# Patient Record
Sex: FEMALE | Race: BLACK OR AFRICAN AMERICAN | NOT HISPANIC OR LATINO
[De-identification: names, ages, dates, MRNs, and addresses within clinical notes are randomized per-mention and may not be internally consistent; named-entity substitution may affect disease eponyms.]

---

## 2017-04-28 ENCOUNTER — CHARTING TRANS (OUTPATIENT)
Dept: OTHER | Age: 37
End: 2017-04-28

## 2017-04-30 ENCOUNTER — CHARTING TRANS (OUTPATIENT)
Dept: OTHER | Age: 37
End: 2017-04-30

## 2018-03-02 ENCOUNTER — CHARTING TRANS (OUTPATIENT)
Dept: OTHER | Age: 38
End: 2018-03-02

## 2018-03-23 ENCOUNTER — CHARTING TRANS (OUTPATIENT)
Dept: OTHER | Age: 38
End: 2018-03-23

## 2018-03-25 ENCOUNTER — CHARTING TRANS (OUTPATIENT)
Dept: OTHER | Age: 38
End: 2018-03-25

## 2018-04-11 ENCOUNTER — CHARTING TRANS (OUTPATIENT)
Dept: OTHER | Age: 38
End: 2018-04-11

## 2018-04-11 ENCOUNTER — LAB SERVICES (OUTPATIENT)
Dept: OTHER | Age: 38
End: 2018-04-11

## 2018-04-11 ASSESSMENT — PAIN SCALES - GENERAL: PAINLEVEL_OUTOF10: 0

## 2018-04-12 ENCOUNTER — CHARTING TRANS (OUTPATIENT)
Dept: OTHER | Age: 38
End: 2018-04-12

## 2018-05-08 ENCOUNTER — IMAGING SERVICES (OUTPATIENT)
Dept: OTHER | Age: 38
End: 2018-05-08

## 2018-05-08 ENCOUNTER — HOSPITAL (OUTPATIENT)
Dept: OTHER | Age: 38
End: 2018-05-08
Attending: FAMILY MEDICINE

## 2018-05-16 ENCOUNTER — MYAURORA ACCOUNT LINK (OUTPATIENT)
Dept: OTHER | Age: 38
End: 2018-05-16

## 2018-05-16 ENCOUNTER — CHARTING TRANS (OUTPATIENT)
Dept: OTHER | Age: 38
End: 2018-05-16

## 2018-05-16 LAB
25(OH)D3+25(OH)D2 SERPL-MCNC: 14.4 NG/ML (ref 30–100)
ALBUMIN SERPL-MCNC: 3.9 G/DL (ref 3.6–5.1)
ALBUMIN/GLOB SERPL: 1.1 (ref 1–2.4)
ALP SERPL-CCNC: 91 UNITS/L (ref 45–117)
ALT SERPL-CCNC: 41 UNITS/L
ANION GAP SERPL CALC-SCNC: 17 MMOL/L (ref 10–20)
APPEARANCE UR: ABNORMAL
AST SERPL-CCNC: 24 UNITS/L
BACTERIA #/AREA URNS HPF: ABNORMAL /HPF
BASOPHILS # BLD: 0.1 K/MCL (ref 0–0.3)
BASOPHILS NFR BLD: 1 %
BILIRUB SERPL-MCNC: 0.3 MG/DL (ref 0.2–1)
BILIRUB UR QL: NEGATIVE
BUN SERPL-MCNC: 9 MG/DL (ref 6–20)
BUN/CREAT SERPL: 9 (ref 7–25)
CALCIUM SERPL-MCNC: 9.7 MG/DL (ref 8.4–10.2)
CHLORIDE SERPL-SCNC: 106 MMOL/L (ref 98–107)
CHOLEST SERPL-MCNC: 135 MG/DL
CHOLEST/HDLC SERPL: 3.1
CO2 SERPL-SCNC: 23 MMOL/L (ref 21–32)
COLOR UR: YELLOW
CREAT SERPL-MCNC: 0.96 MG/DL (ref 0.51–0.95)
DIFFERENTIAL METHOD BLD: ABNORMAL
EOSINOPHIL # BLD: 0.3 K/MCL (ref 0.1–0.5)
EOSINOPHIL NFR BLD: 3 %
ERYTHROCYTE [DISTWIDTH] IN BLOOD: 14.4 % (ref 11–15)
FERRITIN SERPL-MCNC: 128 NG/ML (ref 8–252)
FOLATE SERPL-MCNC: 9 NG/ML
GLOBULIN SER-MCNC: 3.5 G/DL (ref 2–4)
GLUCOSE SERPL-MCNC: 80 MG/DL (ref 65–99)
GLUCOSE UR-MCNC: NEGATIVE MG/DL
HBA1C MFR BLD: 6 % (ref 4.5–5.6)
HDLC SERPL-MCNC: 44 MG/DL
HEMATOCRIT: 40 % (ref 36–46.5)
HEMOGLOBIN: 13.1 G/DL (ref 12–15.5)
HYALINE CASTS #/AREA URNS LPF: ABNORMAL /LPF (ref 0–5)
IMM GRANULOCYTES # BLD AUTO: 0.1 K/MCL (ref 0–0.2)
IMM GRANULOCYTES NFR BLD: 1 %
IRON SATN MFR SERPL: 9 % (ref 15–45)
IRON SERPL-MCNC: 31 MCG/DL (ref 50–170)
KETONES UR-MCNC: NEGATIVE MG/DL
LDLC SERPL CALC-MCNC: 73 MG/DL
LENGTH OF FAST TIME PATIENT: ABNORMAL HRS
LENGTH OF FAST TIME PATIENT: ABNORMAL HRS
LYMPHOCYTES # BLD: 3.1 K/MCL (ref 1–4.8)
LYMPHOCYTES NFR BLD: 30 %
MEAN CORPUSCULAR HEMOGLOBIN: 27.8 PG (ref 26–34)
MEAN CORPUSCULAR HGB CONC: 32.8 G/DL (ref 32–36.5)
MEAN CORPUSCULAR VOLUME: 84.7 FL (ref 78–100)
MONOCYTES # BLD: 0.8 K/MCL (ref 0.3–0.9)
MONOCYTES NFR BLD: 8 %
MUCOUS THREADS URNS QL MICRO: PRESENT
NEUTROPHILS # BLD: 6 K/MCL (ref 1.8–7.7)
NEUTROPHILS NFR BLD: 57 %
NITRITE UR QL: NEGATIVE
NONHDLC SERPL-MCNC: 91 MG/DL
NRBC (NRBCRE): 0 %
PH UR: 5 UNITS (ref 5–7)
PLATELET COUNT: 442 K/MCL (ref 140–450)
POTASSIUM SERPL-SCNC: 4.1 MMOL/L (ref 3.4–5.1)
PROT UR QL: NEGATIVE MG/DL
RBC #/AREA URNS HPF: ABNORMAL /HPF (ref 0–3)
RBC-URINE: ABNORMAL
RED CELL COUNT: 4.72 MIL/MCL (ref 4–5.2)
SODIUM SERPL-SCNC: 142 MMOL/L (ref 135–145)
SP GR UR: 1.01 (ref 1–1.03)
SPECIMEN SOURCE: ABNORMAL
SQUAMOUS #/AREA URNS HPF: ABNORMAL /HPF (ref 0–5)
TIBC SERPL-MCNC: 331 MCG/DL (ref 250–450)
TOTAL PROTEIN: 7.4 G/DL (ref 6.4–8.2)
TRIGL SERPL-MCNC: 90 MG/DL
TSH SERPL-ACNC: 4.43 MCUNITS/ML (ref 0.35–5)
UROBILINOGEN UR QL: 0.2 MG/DL (ref 0–1)
VIT B12 SERPL-MCNC: 404 PG/ML (ref 211–911)
WBC #/AREA URNS HPF: ABNORMAL /HPF (ref 0–5)
WBC-URINE: NEGATIVE
WHITE BLOOD COUNT: 10.3 K/MCL (ref 4.2–11)

## 2018-05-16 ASSESSMENT — PAIN SCALES - GENERAL: PAINLEVEL_OUTOF10: 0

## 2018-07-03 ENCOUNTER — HOSPITAL (OUTPATIENT)
Dept: OTHER | Age: 38
End: 2018-07-03
Attending: FAMILY MEDICINE

## 2018-07-03 ENCOUNTER — IMAGING SERVICES (OUTPATIENT)
Dept: OTHER | Age: 38
End: 2018-07-03

## 2018-07-08 ENCOUNTER — CHARTING TRANS (OUTPATIENT)
Dept: OTHER | Age: 38
End: 2018-07-08

## 2018-07-20 ENCOUNTER — IMAGING SERVICES (OUTPATIENT)
Dept: OTHER | Age: 38
End: 2018-07-20

## 2018-07-20 ENCOUNTER — HOSPITAL (OUTPATIENT)
Dept: OTHER | Age: 38
End: 2018-07-20
Attending: FAMILY MEDICINE

## 2018-11-01 VITALS
DIASTOLIC BLOOD PRESSURE: 72 MMHG | OXYGEN SATURATION: 98 % | TEMPERATURE: 98.1 F | RESPIRATION RATE: 17 BRPM | HEIGHT: 67 IN | BODY MASS INDEX: 34.85 KG/M2 | WEIGHT: 222.04 LBS | SYSTOLIC BLOOD PRESSURE: 120 MMHG | HEART RATE: 85 BPM

## 2018-11-01 VITALS
WEIGHT: 225.01 LBS | RESPIRATION RATE: 16 BRPM | DIASTOLIC BLOOD PRESSURE: 72 MMHG | HEIGHT: 67 IN | BODY MASS INDEX: 35.31 KG/M2 | TEMPERATURE: 99.5 F | SYSTOLIC BLOOD PRESSURE: 116 MMHG | HEART RATE: 74 BPM | OXYGEN SATURATION: 98 %

## 2019-04-10 ENCOUNTER — OFFICE VISIT (OUTPATIENT)
Dept: OCCUPATIONAL MEDICINE | Age: 39
End: 2019-04-10
Attending: PHYSICIAN ASSISTANT

## 2019-04-10 DIAGNOSIS — Z01.84 IMMUNITY STATUS TESTING: Primary | ICD-10-CM

## 2019-04-10 PROCEDURE — 86480 TB TEST CELL IMMUN MEASURE: CPT

## 2020-01-15 PROBLEM — Z30.41 USES ORAL CONTRACEPTION: Status: ACTIVE | Noted: 2020-01-15

## 2020-01-15 PROCEDURE — 87624 HPV HI-RISK TYP POOLED RSLT: CPT | Performed by: NURSE PRACTITIONER

## 2020-01-15 PROCEDURE — 88175 CYTOPATH C/V AUTO FLUID REDO: CPT | Performed by: NURSE PRACTITIONER

## 2020-08-17 ENCOUNTER — LAB REQUISITION (OUTPATIENT)
Dept: LAB | Facility: HOSPITAL | Age: 40
End: 2020-08-17
Payer: COMMERCIAL

## 2020-08-17 DIAGNOSIS — Z20.828 CONTACT WITH AND (SUSPECTED) EXPOSURE TO OTHER VIRAL COMMUNICABLE DISEASES: ICD-10-CM

## 2020-08-17 LAB — SARS-COV-2 RNA RESP QL NAA+PROBE: NOT DETECTED

## 2020-08-18 ENCOUNTER — LAB REQUISITION (OUTPATIENT)
Dept: LAB | Facility: HOSPITAL | Age: 40
End: 2020-08-18
Payer: COMMERCIAL

## 2020-08-18 DIAGNOSIS — M67.439 GANGLION, UNSPECIFIED WRIST: ICD-10-CM

## 2020-08-18 PROCEDURE — 88304 TISSUE EXAM BY PATHOLOGIST: CPT | Performed by: PLASTIC SURGERY

## 2021-11-04 ENCOUNTER — OFFICE VISIT (OUTPATIENT)
Dept: ORTHOPEDICS CLINIC | Facility: CLINIC | Age: 41
End: 2021-11-04
Payer: COMMERCIAL

## 2021-11-04 ENCOUNTER — HOSPITAL ENCOUNTER (OUTPATIENT)
Dept: GENERAL RADIOLOGY | Age: 41
Discharge: HOME | End: 2021-11-04
Attending: PHYSICIAN ASSISTANT
Payer: COMMERCIAL

## 2021-11-04 VITALS — BODY MASS INDEX: 34.53 KG/M2 | HEART RATE: 76 BPM | HEIGHT: 67 IN | WEIGHT: 220 LBS | OXYGEN SATURATION: 98 %

## 2021-11-04 DIAGNOSIS — M25.511 RIGHT SHOULDER PAIN, UNSPECIFIED CHRONICITY: ICD-10-CM

## 2021-11-04 DIAGNOSIS — M75.41 IMPINGEMENT SYNDROME OF RIGHT SHOULDER: Primary | ICD-10-CM

## 2021-11-04 PROCEDURE — 99203 OFFICE O/P NEW LOW 30 MIN: CPT | Performed by: PHYSICIAN ASSISTANT

## 2021-11-04 PROCEDURE — 73030 X-RAY EXAM OF SHOULDER: CPT | Performed by: PHYSICIAN ASSISTANT

## 2021-11-04 PROCEDURE — 3008F BODY MASS INDEX DOCD: CPT | Performed by: PHYSICIAN ASSISTANT

## 2021-11-04 RX ORDER — POTASSIUM GLUCONATE 595(99)MG
1 TABLET ORAL DAILY
COMMUNITY

## 2021-11-04 NOTE — H&P
[facility] - ORTHOPEDICS  [facility] [address] [id number]  [phone]     NEW PATIENT VISIT - HISTORY AND PHYSICAL EXAMINATION     Name: Calli Vega   MRN: [id number]  Date: 11/4/2021     CC: Right shoul negative aside from the aforementioned per history of present illness.     SOCIAL HX:  Social History    Occupational History      Not on file    Tobacco Use      Smoking status: Never Smoker      Smokeless tobacco: Never Used    Vaping Use      Vaping Use: 4+-5-  Infraspinatus/Teres: 5/5    Provocative Tests:   Lawrence:   Positive  Speed's:   Positive  Dickinson's:   Negative  Lift-off:   Negative  Apprehension:  Negative  Sulcus Sign:   Negative    Neurovascular Upper Extremity (Bilateral)  Motor:    5/5 EP

## 2021-11-05 ENCOUNTER — HOSPITAL ENCOUNTER (OUTPATIENT)
Dept: MAMMOGRAPHY | Age: 41
Discharge: HOME | End: 2021-11-05
Attending: FAMILY MEDICINE
Payer: COMMERCIAL

## 2021-11-05 DIAGNOSIS — Z12.31 ENCOUNTER FOR SCREENING MAMMOGRAM FOR MALIGNANT NEOPLASM OF BREAST: ICD-10-CM

## 2021-11-05 PROCEDURE — 77063 BREAST TOMOSYNTHESIS BI: CPT | Performed by: FAMILY MEDICINE

## 2021-11-05 PROCEDURE — 77067 SCR MAMMO BI INCL CAD: CPT | Performed by: FAMILY MEDICINE

## 2021-11-20 ENCOUNTER — HOSPITAL ENCOUNTER (OUTPATIENT)
Dept: MRI IMAGING | Age: 41
Discharge: HOME | End: 2021-11-20
Attending: PHYSICIAN ASSISTANT
Payer: COMMERCIAL

## 2021-11-20 DIAGNOSIS — M75.41 IMPINGEMENT SYNDROME OF RIGHT SHOULDER: ICD-10-CM

## 2021-11-21 ENCOUNTER — HOSPITAL ENCOUNTER (OUTPATIENT)
Dept: MRI IMAGING | Facility: HOSPITAL | Age: 41
Discharge: HOME | End: 2021-11-21
Attending: PHYSICIAN ASSISTANT
Payer: COMMERCIAL

## 2021-11-21 PROCEDURE — 73221 MRI JOINT UPR EXTREM W/O DYE: CPT | Performed by: PHYSICIAN ASSISTANT

## 2021-11-24 ENCOUNTER — OFFICE VISIT (OUTPATIENT)
Dept: ORTHOPEDICS CLINIC | Facility: CLINIC | Age: 41
End: 2021-11-24
Payer: COMMERCIAL

## 2021-11-24 VITALS — HEART RATE: 76 BPM | BODY MASS INDEX: 34.53 KG/M2 | WEIGHT: 220 LBS | HEIGHT: 67 IN | OXYGEN SATURATION: 98 %

## 2021-11-24 DIAGNOSIS — M75.41 IMPINGEMENT SYNDROME OF RIGHT SHOULDER: ICD-10-CM

## 2021-11-24 DIAGNOSIS — M67.911 TENDINOPATHY OF RIGHT ROTATOR CUFF: Primary | ICD-10-CM

## 2021-11-24 PROCEDURE — 3008F BODY MASS INDEX DOCD: CPT | Performed by: PHYSICIAN ASSISTANT

## 2021-11-24 PROCEDURE — 99214 OFFICE O/P EST MOD 30 MIN: CPT | Performed by: PHYSICIAN ASSISTANT

## 2021-11-24 RX ORDER — MELOXICAM 15 MG/1
15 TABLET ORAL DAILY
Qty: 30 TABLET | Refills: 2 | Status: SHIPPED | OUTPATIENT
Start: 2021-11-24 | End: 2021-12-24

## 2021-11-24 NOTE — PROGRESS NOTES
[facility] - [facility]OPED[facility]  [address]       Name: Leopoldo Howell   MRN: [id number]  Date: 11/24/2021     REASON FOR VISIT: Follow up for MRI right shoulder.      INTERVAL HIST none  Atrophy:                             none    Scapular winging:            Negative     Palpation:                            AC Joint                              Negative  Biceps Tendon                  Positive  Greater Tuberosity year with no injury. Patient states pain will radiate down the lateral portion of her upper arm and she has pain with abduction. FINDINGS:  The glenohumeral joint space is maintained. There is no fracture, subluxation or dislocation.   There is no focal Mild AC joint hypertrophy without significant impression on the bursal surface of supraspinatus muscle or tendon. 4. Biceps tendon is intact.    Dictated by (CST): Tyshawn Chavarria MD on 11/22/2021 at 8:39 AM     Finalized by (CST): Tyshawn Chavarria MD on 11/22/2 IMPRESSION: Kip Wellington is a 37year old female who presented for follow up of right shoulder rotator cuff tendinopathy/impingement syndrome.        PLAN:   We had a detailed discussion outlining the etiology, anatomy, pathophysiology, and natura

## 2021-12-02 ENCOUNTER — HOSPITAL ENCOUNTER (OUTPATIENT)
Dept: MAMMOGRAPHY | Facility: HOSPITAL | Age: 41
Discharge: HOME | End: 2021-12-02
Attending: FAMILY MEDICINE
Payer: COMMERCIAL

## 2021-12-02 DIAGNOSIS — R92.2 INCONCLUSIVE MAMMOGRAM: ICD-10-CM

## 2021-12-02 PROCEDURE — 77066 DX MAMMO INCL CAD BI: CPT | Performed by: FAMILY MEDICINE

## 2021-12-02 PROCEDURE — 76642 ULTRASOUND BREAST LIMITED: CPT | Performed by: FAMILY MEDICINE

## 2021-12-02 PROCEDURE — 76641 ULTRASOUND BREAST COMPLETE: CPT | Performed by: FAMILY MEDICINE

## 2021-12-02 PROCEDURE — 77062 BREAST TOMOSYNTHESIS BI: CPT | Performed by: FAMILY MEDICINE

## 2021-12-02 NOTE — IMAGING NOTE
This [facility] RN assisted Dr. Casimiro Morrell with recommendation for a right breast 1 site ultrasound guided biopsy for mass. Procedure reviewed and all questions answered. Emotional and educational support given.    On the day of the biopsy, pt instructed to t

## 2021-12-20 ENCOUNTER — HOSPITAL ENCOUNTER (OUTPATIENT)
Dept: MAMMOGRAPHY | Facility: HOSPITAL | Age: 41
Discharge: HOME | End: 2021-12-20
Attending: FAMILY MEDICINE
Payer: COMMERCIAL

## 2021-12-20 DIAGNOSIS — R92.2 INCONCLUSIVE MAMMOGRAM: ICD-10-CM

## 2021-12-20 PROCEDURE — 88342 IMHCHEM/IMCYTCHM 1ST ANTB: CPT

## 2021-12-20 PROCEDURE — 77065 DX MAMMO INCL CAD UNI: CPT | Performed by: FAMILY MEDICINE

## 2021-12-20 PROCEDURE — 88341 IMHCHEM/IMCYTCHM EA ADD ANTB: CPT

## 2021-12-20 PROCEDURE — 88305 TISSUE EXAM BY PATHOLOGIST: CPT

## 2021-12-20 PROCEDURE — 19083 BX BREAST 1ST LESION US IMAG: CPT | Performed by: FAMILY MEDICINE

## 2021-12-21 ENCOUNTER — TELEPHONE (OUTPATIENT)
Dept: MAMMOGRAPHY | Facility: HOSPITAL | Age: 41
End: 2021-12-21

## 2021-12-21 NOTE — TELEPHONE ENCOUNTER
Telephoned Jose Najera and name,  verified with patient. Notified Jose Najera of right breast negative for malignancy biopsy result. Concordance pending. Jose Najera reports biopsy site is healing well.   Radiologist recommends next mammog

## 2022-01-19 ENCOUNTER — OFFICE VISIT (OUTPATIENT)
Dept: ORTHOPEDICS CLINIC | Facility: CLINIC | Age: 42
End: 2022-01-19
Payer: COMMERCIAL

## 2022-01-19 VITALS — HEIGHT: 67 IN | BODY MASS INDEX: 35.79 KG/M2 | WEIGHT: 228 LBS

## 2022-01-19 DIAGNOSIS — M75.41 IMPINGEMENT SYNDROME OF RIGHT SHOULDER: Primary | ICD-10-CM

## 2022-01-19 PROCEDURE — 3008F BODY MASS INDEX DOCD: CPT | Performed by: PHYSICIAN ASSISTANT

## 2022-01-19 PROCEDURE — 99213 OFFICE O/P EST LOW 20 MIN: CPT | Performed by: PHYSICIAN ASSISTANT

## 2022-01-19 NOTE — PROGRESS NOTES
[facility] - ORTHOPEDICS   [address]   [phone]       Name: Charmaine Lindsey   MRN: [id number]  Date: 1/19/2022     REASON FOR VISIT: Follow up for right shoulder rotator cuff tendinopathy/impingeme The contralateral shoulder is without limitation in range of motion or strength, no positive provocative maneuvers. Radiographic Examination/Diagnostics:    I personally viewed, independently interpreted and radiology report was reviewed.     [facility] Pathology Department. LESION(S) LOCALIZED:    12 o'clock position of the right breast 5 cm from the nipple BIOPSY NEEDLE:  12 gauge CLIP TYPE:                Rhianna # CORES OBTAINED:  6            CONCLUSION:  1.  Successful ultrasound biopsy of appropriate position. Post procedural mammogram performed on separate digital equipment show the clip in appropriate position. The procedure was tolerated well by the patient with no immediate complications.   All cores were submitted to the [facility]

## 2022-01-31 PROCEDURE — 88302 TISSUE EXAM BY PATHOLOGIST: CPT | Performed by: OBSTETRICS & GYNECOLOGY

## 2022-02-16 PROBLEM — D25.2 INTRAMURAL AND SUBSEROUS LEIOMYOMA OF UTERUS: Status: ACTIVE | Noted: 2022-02-16

## 2022-02-16 PROBLEM — D25.1 INTRAMURAL AND SUBSEROUS LEIOMYOMA OF UTERUS: Status: ACTIVE | Noted: 2022-02-16

## 2022-02-16 PROBLEM — Z98.51 S/P TUBAL LIGATION: Status: ACTIVE | Noted: 2022-02-16

## 2022-02-16 PROBLEM — Z30.41 USES ORAL CONTRACEPTION: Status: RESOLVED | Noted: 2020-01-15 | Resolved: 2022-02-16

## 2022-08-18 ENCOUNTER — HOSPITAL ENCOUNTER (OUTPATIENT)
Dept: MAMMOGRAPHY | Facility: HOSPITAL | Age: 42
Discharge: HOME | End: 2022-08-18
Attending: FAMILY MEDICINE
Payer: COMMERCIAL

## 2022-08-18 DIAGNOSIS — Z98.890 STATUS POST RIGHT BREAST BIOPSY: ICD-10-CM

## 2022-08-18 PROCEDURE — 76642 ULTRASOUND BREAST LIMITED: CPT | Performed by: FAMILY MEDICINE

## 2022-08-18 PROCEDURE — 77065 DX MAMMO INCL CAD UNI: CPT | Performed by: FAMILY MEDICINE

## 2022-08-18 PROCEDURE — 77061 BREAST TOMOSYNTHESIS UNI: CPT | Performed by: FAMILY MEDICINE

## 2022-10-24 ENCOUNTER — LAB ENCOUNTER (OUTPATIENT)
Dept: LAB | Age: 42
End: 2022-10-24
Attending: FAMILY MEDICINE
Payer: COMMERCIAL

## 2022-10-24 ENCOUNTER — OFFICE VISIT (OUTPATIENT)
Dept: FAMILY MEDICINE CLINIC | Facility: CLINIC | Age: 42
End: 2022-10-24
Payer: COMMERCIAL

## 2022-10-24 VITALS
SYSTOLIC BLOOD PRESSURE: 114 MMHG | HEIGHT: 68 IN | WEIGHT: 227 LBS | RESPIRATION RATE: 16 BRPM | HEART RATE: 71 BPM | OXYGEN SATURATION: 98 % | DIASTOLIC BLOOD PRESSURE: 84 MMHG | BODY MASS INDEX: 34.4 KG/M2

## 2022-10-24 DIAGNOSIS — Z00.00 LABORATORY EXAMINATION ORDERED AS PART OF A ROUTINE GENERAL MEDICAL EXAMINATION: ICD-10-CM

## 2022-10-24 DIAGNOSIS — L30.9 ECZEMA, UNSPECIFIED TYPE: ICD-10-CM

## 2022-10-24 DIAGNOSIS — Z00.00 WELLNESS EXAMINATION: Primary | ICD-10-CM

## 2022-10-24 DIAGNOSIS — Z12.31 ENCOUNTER FOR SCREENING MAMMOGRAM FOR BREAST CANCER: ICD-10-CM

## 2022-10-24 LAB
ALBUMIN SERPL-MCNC: 3.5 G/DL (ref 3.4–5)
ALBUMIN/GLOB SERPL: 0.9 {RATIO} (ref 1–2)
ALP LIVER SERPL-CCNC: 101 U/L
ALT SERPL-CCNC: 22 U/L
ANION GAP SERPL CALC-SCNC: 4 MMOL/L (ref 0–18)
AST SERPL-CCNC: 16 U/L (ref 15–37)
BASOPHILS # BLD AUTO: 0.05 X10(3) UL (ref 0–0.2)
BASOPHILS NFR BLD AUTO: 0.6 %
BILIRUB SERPL-MCNC: 0.4 MG/DL (ref 0.1–2)
BUN BLD-MCNC: 9 MG/DL (ref 7–18)
CALCIUM BLD-MCNC: 9.1 MG/DL (ref 8.5–10.1)
CHLORIDE SERPL-SCNC: 109 MMOL/L (ref 98–112)
CHOLEST SERPL-MCNC: 128 MG/DL (ref ?–200)
CO2 SERPL-SCNC: 26 MMOL/L (ref 21–32)
CREAT BLD-MCNC: 0.9 MG/DL
EOSINOPHIL # BLD AUTO: 0.4 X10(3) UL (ref 0–0.7)
EOSINOPHIL NFR BLD AUTO: 5.2 %
ERYTHROCYTE [DISTWIDTH] IN BLOOD BY AUTOMATED COUNT: 15.1 %
FASTING PATIENT LIPID ANSWER: YES
FASTING STATUS PATIENT QL REPORTED: YES
GFR SERPLBLD BASED ON 1.73 SQ M-ARVRAT: 82 ML/MIN/1.73M2 (ref 60–?)
GLOBULIN PLAS-MCNC: 4 G/DL (ref 2.8–4.4)
GLUCOSE BLD-MCNC: 98 MG/DL (ref 70–99)
HCT VFR BLD AUTO: 36.8 %
HDLC SERPL-MCNC: 44 MG/DL (ref 40–59)
HGB BLD-MCNC: 11.6 G/DL
IMM GRANULOCYTES # BLD AUTO: 0.03 X10(3) UL (ref 0–1)
IMM GRANULOCYTES NFR BLD: 0.4 %
LDLC SERPL CALC-MCNC: 74 MG/DL (ref ?–100)
LYMPHOCYTES # BLD AUTO: 2.2 X10(3) UL (ref 1–4)
LYMPHOCYTES NFR BLD AUTO: 28.4 %
MCH RBC QN AUTO: 26.5 PG (ref 26–34)
MCHC RBC AUTO-ENTMCNC: 31.5 G/DL (ref 31–37)
MCV RBC AUTO: 84 FL
MONOCYTES # BLD AUTO: 0.76 X10(3) UL (ref 0.1–1)
MONOCYTES NFR BLD AUTO: 9.8 %
NEUTROPHILS # BLD AUTO: 4.3 X10 (3) UL (ref 1.5–7.7)
NEUTROPHILS # BLD AUTO: 4.3 X10(3) UL (ref 1.5–7.7)
NEUTROPHILS NFR BLD AUTO: 55.6 %
NONHDLC SERPL-MCNC: 84 MG/DL (ref ?–130)
OSMOLALITY SERPL CALC.SUM OF ELEC: 287 MOSM/KG (ref 275–295)
PLATELET # BLD AUTO: 432 10(3)UL (ref 150–450)
POTASSIUM SERPL-SCNC: 4.2 MMOL/L (ref 3.5–5.1)
PROT SERPL-MCNC: 7.5 G/DL (ref 6.4–8.2)
RBC # BLD AUTO: 4.38 X10(6)UL
SODIUM SERPL-SCNC: 139 MMOL/L (ref 136–145)
TRIGL SERPL-MCNC: 41 MG/DL (ref 30–149)
TSI SER-ACNC: 3.41 MIU/ML (ref 0.36–3.74)
VLDLC SERPL CALC-MCNC: 6 MG/DL (ref 0–30)
WBC # BLD AUTO: 7.7 X10(3) UL (ref 4–11)

## 2022-10-24 PROCEDURE — 99386 PREV VISIT NEW AGE 40-64: CPT | Performed by: FAMILY MEDICINE

## 2022-10-24 PROCEDURE — 80061 LIPID PANEL: CPT

## 2022-10-24 PROCEDURE — 80053 COMPREHEN METABOLIC PANEL: CPT

## 2022-10-24 PROCEDURE — 84443 ASSAY THYROID STIM HORMONE: CPT

## 2022-10-24 PROCEDURE — 3079F DIAST BP 80-89 MM HG: CPT | Performed by: FAMILY MEDICINE

## 2022-10-24 PROCEDURE — 36415 COLL VENOUS BLD VENIPUNCTURE: CPT

## 2022-10-24 PROCEDURE — 85025 COMPLETE CBC W/AUTO DIFF WBC: CPT

## 2022-10-24 PROCEDURE — 3074F SYST BP LT 130 MM HG: CPT | Performed by: FAMILY MEDICINE

## 2022-10-24 PROCEDURE — 3008F BODY MASS INDEX DOCD: CPT | Performed by: FAMILY MEDICINE

## 2022-10-24 RX ORDER — TRIAMCINOLONE ACETONIDE 1 MG/G
CREAM TOPICAL 2 TIMES DAILY PRN
Qty: 60 G | Refills: 0 | Status: SHIPPED | OUTPATIENT
Start: 2022-10-24

## 2022-10-28 ENCOUNTER — TELEPHONE (OUTPATIENT)
Dept: FAMILY MEDICINE CLINIC | Facility: CLINIC | Age: 42
End: 2022-10-28

## 2022-10-28 NOTE — TELEPHONE ENCOUNTER
----- Message from hCeryl Mcbride MD sent at 10/26/2022  1:22 PM CDT -----  Labs overall are normal. She is mildly anemic, suspect normal as she is menstruating age. If she is symptomatic (fatigue etc.) we can order iron studies to evaluate for any iron deficiency.

## 2022-10-28 NOTE — TELEPHONE ENCOUNTER
[employer] message sent to pt with explanation of results and recommendations from provider.

## 2022-11-09 ENCOUNTER — HOSPITAL ENCOUNTER (OUTPATIENT)
Dept: MAMMOGRAPHY | Age: 42
Discharge: HOME | End: 2022-11-09
Attending: FAMILY MEDICINE
Payer: COMMERCIAL

## 2022-11-09 DIAGNOSIS — Z12.31 ENCOUNTER FOR SCREENING MAMMOGRAM FOR BREAST CANCER: ICD-10-CM

## 2022-11-09 PROCEDURE — 77067 SCR MAMMO BI INCL CAD: CPT | Performed by: FAMILY MEDICINE

## 2022-11-09 PROCEDURE — 77063 BREAST TOMOSYNTHESIS BI: CPT | Performed by: FAMILY MEDICINE

## 2022-11-10 ENCOUNTER — TELEPHONE (OUTPATIENT)
Dept: FAMILY MEDICINE CLINIC | Facility: CLINIC | Age: 42
End: 2022-11-10

## 2022-11-10 NOTE — TELEPHONE ENCOUNTER
----- Message from Sebas Chandler MD sent at 11/9/2022  5:14 PM CST -----  Normal mammogram, repeat annually.

## 2023-01-09 ENCOUNTER — OFFICE VISIT (OUTPATIENT)
Dept: ORTHOPEDICS CLINIC | Facility: CLINIC | Age: 43
End: 2023-01-09
Payer: COMMERCIAL

## 2023-01-09 ENCOUNTER — HOSPITAL ENCOUNTER (OUTPATIENT)
Dept: GENERAL RADIOLOGY | Age: 43
Discharge: HOME | End: 2023-01-09
Attending: PHYSICIAN ASSISTANT
Payer: COMMERCIAL

## 2023-01-09 DIAGNOSIS — R52 PAIN: ICD-10-CM

## 2023-01-09 DIAGNOSIS — S46.001A ROTATOR CUFF INJURY, RIGHT, INITIAL ENCOUNTER: Primary | ICD-10-CM

## 2023-01-09 PROCEDURE — 88175 CYTOPATH C/V AUTO FLUID REDO: CPT

## 2023-01-09 PROCEDURE — 99214 OFFICE O/P EST MOD 30 MIN: CPT | Performed by: PHYSICIAN ASSISTANT

## 2023-01-09 PROCEDURE — 87624 HPV HI-RISK TYP POOLED RSLT: CPT

## 2023-01-09 PROCEDURE — 73030 X-RAY EXAM OF SHOULDER: CPT | Performed by: PHYSICIAN ASSISTANT

## 2023-01-16 ENCOUNTER — HOSPITAL ENCOUNTER (OUTPATIENT)
Dept: MRI IMAGING | Age: 43
Discharge: HOME | End: 2023-01-16
Attending: PHYSICIAN ASSISTANT
Payer: COMMERCIAL

## 2023-01-16 DIAGNOSIS — S46.001A ROTATOR CUFF INJURY, RIGHT, INITIAL ENCOUNTER: ICD-10-CM

## 2023-01-16 PROCEDURE — 73221 MRI JOINT UPR EXTREM W/O DYE: CPT | Performed by: PHYSICIAN ASSISTANT

## 2023-01-18 ENCOUNTER — OFFICE VISIT (OUTPATIENT)
Dept: ORTHOPEDICS CLINIC | Facility: CLINIC | Age: 43
End: 2023-01-18
Payer: COMMERCIAL

## 2023-01-18 DIAGNOSIS — M75.41 ROTATOR CUFF IMPINGEMENT SYNDROME OF RIGHT SHOULDER: Primary | ICD-10-CM

## 2023-01-18 DIAGNOSIS — S46.011A TRAUMATIC INCOMPLETE TEAR OF RIGHT ROTATOR CUFF, INITIAL ENCOUNTER: ICD-10-CM

## 2023-01-18 PROCEDURE — 99215 OFFICE O/P EST HI 40 MIN: CPT | Performed by: PHYSICIAN ASSISTANT

## 2023-01-18 RX ORDER — MELOXICAM 15 MG/1
15 TABLET ORAL DAILY
Qty: 30 TABLET | Refills: 0 | Status: SHIPPED | OUTPATIENT
Start: 2023-01-18 | End: 2023-02-17

## 2023-10-31 ENCOUNTER — LAB ENCOUNTER (OUTPATIENT)
Dept: LAB | Age: 43
End: 2023-10-31
Attending: FAMILY MEDICINE
Payer: COMMERCIAL

## 2023-10-31 ENCOUNTER — OFFICE VISIT (OUTPATIENT)
Dept: FAMILY MEDICINE CLINIC | Facility: CLINIC | Age: 43
End: 2023-10-31

## 2023-10-31 VITALS
HEIGHT: 67 IN | HEART RATE: 83 BPM | RESPIRATION RATE: 16 BRPM | BODY MASS INDEX: 37.98 KG/M2 | OXYGEN SATURATION: 98 % | WEIGHT: 242 LBS | DIASTOLIC BLOOD PRESSURE: 74 MMHG | SYSTOLIC BLOOD PRESSURE: 110 MMHG

## 2023-10-31 DIAGNOSIS — E66.09 CLASS 2 OBESITY DUE TO EXCESS CALORIES WITHOUT SERIOUS COMORBIDITY WITH BODY MASS INDEX (BMI) OF 37.0 TO 37.9 IN ADULT: ICD-10-CM

## 2023-10-31 DIAGNOSIS — Z00.00 LABORATORY EXAMINATION ORDERED AS PART OF A ROUTINE GENERAL MEDICAL EXAMINATION: ICD-10-CM

## 2023-10-31 DIAGNOSIS — Z12.31 ENCOUNTER FOR SCREENING MAMMOGRAM FOR BREAST CANCER: ICD-10-CM

## 2023-10-31 DIAGNOSIS — J30.9 ALLERGIC RHINITIS, UNSPECIFIED SEASONALITY, UNSPECIFIED TRIGGER: ICD-10-CM

## 2023-10-31 DIAGNOSIS — M67.40 GANGLION CYST: ICD-10-CM

## 2023-10-31 DIAGNOSIS — Z00.00 WELLNESS EXAMINATION: Primary | ICD-10-CM

## 2023-10-31 PROBLEM — L20.84 INTRINSIC ECZEMA: Status: ACTIVE | Noted: 2023-10-31

## 2023-10-31 LAB
ALBUMIN SERPL-MCNC: 3.5 G/DL (ref 3.4–5)
ALBUMIN/GLOB SERPL: 0.8 {RATIO} (ref 1–2)
ALP LIVER SERPL-CCNC: 94 U/L
ALT SERPL-CCNC: 35 U/L
ANION GAP SERPL CALC-SCNC: 2 MMOL/L (ref 0–18)
AST SERPL-CCNC: 14 U/L (ref 15–37)
BASOPHILS # BLD AUTO: 0.06 X10(3) UL (ref 0–0.2)
BASOPHILS NFR BLD AUTO: 0.6 %
BILIRUB SERPL-MCNC: 0.5 MG/DL (ref 0.1–2)
BUN BLD-MCNC: 11 MG/DL (ref 9–23)
CALCIUM BLD-MCNC: 9.5 MG/DL (ref 8.5–10.1)
CHLORIDE SERPL-SCNC: 109 MMOL/L (ref 98–112)
CHOLEST SERPL-MCNC: 136 MG/DL (ref ?–200)
CO2 SERPL-SCNC: 27 MMOL/L (ref 21–32)
CREAT BLD-MCNC: 1.06 MG/DL
EGFRCR SERPLBLD CKD-EPI 2021: 67 ML/MIN/1.73M2 (ref 60–?)
EOSINOPHIL # BLD AUTO: 0.62 X10(3) UL (ref 0–0.7)
EOSINOPHIL NFR BLD AUTO: 6 %
ERYTHROCYTE [DISTWIDTH] IN BLOOD BY AUTOMATED COUNT: 16.3 %
FASTING PATIENT LIPID ANSWER: YES
FASTING STATUS PATIENT QL REPORTED: YES
GLOBULIN PLAS-MCNC: 4.2 G/DL (ref 2.8–4.4)
GLUCOSE BLD-MCNC: 98 MG/DL (ref 70–99)
HCT VFR BLD AUTO: 34.9 %
HDLC SERPL-MCNC: 44 MG/DL (ref 40–59)
HGB BLD-MCNC: 11.2 G/DL
IMM GRANULOCYTES # BLD AUTO: 0.03 X10(3) UL (ref 0–1)
IMM GRANULOCYTES NFR BLD: 0.3 %
LDLC SERPL CALC-MCNC: 79 MG/DL (ref ?–100)
LYMPHOCYTES # BLD AUTO: 2.34 X10(3) UL (ref 1–4)
LYMPHOCYTES NFR BLD AUTO: 22.7 %
MCH RBC QN AUTO: 24.6 PG (ref 26–34)
MCHC RBC AUTO-ENTMCNC: 32.1 G/DL (ref 31–37)
MCV RBC AUTO: 76.7 FL
MONOCYTES # BLD AUTO: 0.96 X10(3) UL (ref 0.1–1)
MONOCYTES NFR BLD AUTO: 9.3 %
NEUTROPHILS # BLD AUTO: 6.28 X10 (3) UL (ref 1.5–7.7)
NEUTROPHILS # BLD AUTO: 6.28 X10(3) UL (ref 1.5–7.7)
NEUTROPHILS NFR BLD AUTO: 61.1 %
NONHDLC SERPL-MCNC: 92 MG/DL (ref ?–130)
OSMOLALITY SERPL CALC.SUM OF ELEC: 285 MOSM/KG (ref 275–295)
PLATELET # BLD AUTO: 469 10(3)UL (ref 150–450)
POTASSIUM SERPL-SCNC: 4 MMOL/L (ref 3.5–5.1)
PROT SERPL-MCNC: 7.7 G/DL (ref 6.4–8.2)
RBC # BLD AUTO: 4.55 X10(6)UL
SODIUM SERPL-SCNC: 138 MMOL/L (ref 136–145)
TRIGL SERPL-MCNC: 65 MG/DL (ref 30–149)
TSI SER-ACNC: 3.37 MIU/ML (ref 0.36–3.74)
VLDLC SERPL CALC-MCNC: 10 MG/DL (ref 0–30)
WBC # BLD AUTO: 10.3 X10(3) UL (ref 4–11)

## 2023-10-31 PROCEDURE — 99214 OFFICE O/P EST MOD 30 MIN: CPT | Performed by: FAMILY MEDICINE

## 2023-10-31 PROCEDURE — 85025 COMPLETE CBC W/AUTO DIFF WBC: CPT

## 2023-10-31 PROCEDURE — 84443 ASSAY THYROID STIM HORMONE: CPT

## 2023-10-31 PROCEDURE — 80053 COMPREHEN METABOLIC PANEL: CPT

## 2023-10-31 PROCEDURE — 36415 COLL VENOUS BLD VENIPUNCTURE: CPT

## 2023-10-31 PROCEDURE — 80061 LIPID PANEL: CPT

## 2023-10-31 PROCEDURE — 99396 PREV VISIT EST AGE 40-64: CPT | Performed by: FAMILY MEDICINE

## 2023-10-31 PROCEDURE — 3074F SYST BP LT 130 MM HG: CPT | Performed by: FAMILY MEDICINE

## 2023-10-31 PROCEDURE — 3078F DIAST BP <80 MM HG: CPT | Performed by: FAMILY MEDICINE

## 2023-10-31 PROCEDURE — 3008F BODY MASS INDEX DOCD: CPT | Performed by: FAMILY MEDICINE

## 2023-10-31 RX ORDER — PHENTERMINE HYDROCHLORIDE 15 MG/1
15 CAPSULE ORAL EVERY MORNING
Qty: 30 CAPSULE | Refills: 2 | Status: SHIPPED | OUTPATIENT
Start: 2023-10-31

## 2023-10-31 RX ORDER — MONTELUKAST SODIUM 10 MG/1
10 TABLET ORAL DAILY
Qty: 90 TABLET | Refills: 3 | Status: SHIPPED | OUTPATIENT
Start: 2023-10-31

## 2023-10-31 RX ORDER — FLUTICASONE PROPIONATE 50 MCG
2 SPRAY, SUSPENSION (ML) NASAL DAILY
Qty: 3 EACH | Refills: 3 | Status: SHIPPED | OUTPATIENT
Start: 2023-10-31 | End: 2024-10-25

## 2023-11-27 ENCOUNTER — HOSPITAL ENCOUNTER (OUTPATIENT)
Dept: MAMMOGRAPHY | Age: 43
Discharge: HOME | End: 2023-11-27
Attending: FAMILY MEDICINE
Payer: COMMERCIAL

## 2023-11-27 DIAGNOSIS — Z12.31 ENCOUNTER FOR SCREENING MAMMOGRAM FOR BREAST CANCER: ICD-10-CM

## 2023-11-27 PROCEDURE — 77067 SCR MAMMO BI INCL CAD: CPT | Performed by: FAMILY MEDICINE

## 2023-11-27 PROCEDURE — 77063 BREAST TOMOSYNTHESIS BI: CPT | Performed by: FAMILY MEDICINE

## 2023-12-11 ENCOUNTER — HOSPITAL ENCOUNTER (OUTPATIENT)
Dept: MAMMOGRAPHY | Facility: HOSPITAL | Age: 43
Discharge: HOME | End: 2023-12-11
Attending: FAMILY MEDICINE
Payer: COMMERCIAL

## 2023-12-11 DIAGNOSIS — R92.2 INCONCLUSIVE MAMMOGRAM: ICD-10-CM

## 2023-12-11 PROCEDURE — 77061 BREAST TOMOSYNTHESIS UNI: CPT | Performed by: FAMILY MEDICINE

## 2023-12-11 PROCEDURE — 77065 DX MAMMO INCL CAD UNI: CPT | Performed by: FAMILY MEDICINE

## 2024-04-01 ENCOUNTER — HOSPITAL ENCOUNTER (OUTPATIENT)
Age: 44
Discharge: HOME | End: 2024-04-01
Payer: COMMERCIAL

## 2024-04-01 VITALS
WEIGHT: 230 LBS | TEMPERATURE: 99 F | BODY MASS INDEX: 36.1 KG/M2 | OXYGEN SATURATION: 100 % | RESPIRATION RATE: 18 BRPM | HEART RATE: 78 BPM | DIASTOLIC BLOOD PRESSURE: 80 MMHG | HEIGHT: 67 IN | SYSTOLIC BLOOD PRESSURE: 119 MMHG

## 2024-04-01 DIAGNOSIS — H65.91 RIGHT OTITIS MEDIA WITH EFFUSION: Primary | ICD-10-CM

## 2024-04-01 PROCEDURE — 99212 OFFICE O/P EST SF 10 MIN: CPT

## 2024-04-01 PROCEDURE — 99203 OFFICE O/P NEW LOW 30 MIN: CPT

## 2024-04-01 NOTE — ED INITIAL ASSESSMENT (HPI)
Pt having rt ear pain since yesterday.   Pt states about a week ago felt irritation to ear (felt like ? Something was in ear)   pt did have uri symptoms a couple weeks ago.   Able to hear ok.

## 2024-04-01 NOTE — ED PROVIDER NOTES
Patient Seen in: [facility]      History     Chief Complaint   Patient presents with    Ear Pain     Stated Complaint: Ear Problem Pain    Subjective:   HPI    42 YO female presents to immediate care for evaluation of intermittent right ear pain starting yesterday. Had viral URI symptoms a few weeks ago. Denies ear discharge, hearing loss or fever. OTC ear drops provide temporary relief.          Objective:   Past Medical History:   Diagnosis Date    COVID     2021              Past Surgical History:   Procedure Laterality Date      2016    NEEDLE BIOPSY RIGHT  2021    Nodular adenosi, fibroadenomatous change    ORAL SURGERY                  No pertinent social history.            Review of Systems    Positive for stated complaint: Ear Problem Pain  Other systems are as noted in HPI.  Constitutional and vital signs reviewed.      All other systems reviewed and negative except as noted above.    Physical Exam     ED Triage Vitals [24 1752]   /80   Pulse 78   Resp 18   Temp 98.6 °F (37 °C)   Temp src Temporal   SpO2 100 %   O2 Device None (Room air)       Current:/80   Pulse 78   Temp 98.6 °F (37 °C) (Temporal)   Resp 18   Ht 170.2 cm (5' 7\")   Wt 104.3 kg   LMP 03/10/2024 (Approximate)   SpO2 100%   BMI 36.02 kg/m²         Physical Exam  Vitals and nursing note reviewed.   Constitutional:       General: She is not in acute distress.     Appearance: Normal appearance. She is not ill-appearing, toxic-appearing or diaphoretic.   HENT:      Right Ear: A middle ear effusion is present.      Left Ear:  No middle ear effusion.      Nose: No congestion or rhinorrhea.   Cardiovascular:      Rate and Rhythm: Normal rate.   Pulmonary:      Effort: Pulmonary effort is normal. No respiratory distress.   Neurological:      Mental Status: She is alert and oriented to person, place, and time.   Psychiatric:         Mood and Affect: Mood normal.         Behavior:  Behavior normal.         ED Course   Labs Reviewed - No data to display      MDM      44 YO female with intermittent right ear pain starting yesterday.     Differential diagnosis considered but not limited acute otitis media with effusion, AOM, mastoiditis    Physical exam is consistent with right otitis media with effusion. No signs of infection. Patient education provided. Supportive care and return instructions discussed with understanding.       Medical Decision Making  Risk  OTC drugs.        Disposition and Plan     Clinical Impression:  1. Right otitis media with effusion         Disposition:  Discharge  4/1/2024  6:24 pm    Follow-up:  [facility]  [address]  formerly [facility] [address]  [phone]    If symptoms worsen          Medications Prescribed:  Discharge Medication List as of 4/1/2024  6:25 PM

## 2024-04-11 ENCOUNTER — PATIENT MESSAGE (OUTPATIENT)
Dept: FAMILY MEDICINE CLINIC | Facility: CLINIC | Age: 44
End: 2024-04-11

## 2024-10-17 ENCOUNTER — OFFICE VISIT (OUTPATIENT)
Dept: FAMILY MEDICINE CLINIC | Facility: CLINIC | Age: 44
End: 2024-10-17
Payer: COMMERCIAL

## 2024-10-17 ENCOUNTER — LAB ENCOUNTER (OUTPATIENT)
Dept: LAB | Age: 44
End: 2024-10-17
Attending: NURSE PRACTITIONER
Payer: COMMERCIAL

## 2024-10-17 ENCOUNTER — TELEPHONE (OUTPATIENT)
Dept: FAMILY MEDICINE CLINIC | Facility: CLINIC | Age: 44
End: 2024-10-17

## 2024-10-17 VITALS
HEIGHT: 67 IN | BODY MASS INDEX: 37.83 KG/M2 | HEART RATE: 68 BPM | DIASTOLIC BLOOD PRESSURE: 80 MMHG | WEIGHT: 241 LBS | RESPIRATION RATE: 16 BRPM | SYSTOLIC BLOOD PRESSURE: 110 MMHG | OXYGEN SATURATION: 98 %

## 2024-10-17 DIAGNOSIS — Z12.31 ENCOUNTER FOR SCREENING MAMMOGRAM FOR MALIGNANT NEOPLASM OF BREAST: ICD-10-CM

## 2024-10-17 DIAGNOSIS — Z00.00 LABORATORY EXAM ORDERED AS PART OF ROUTINE GENERAL MEDICAL EXAMINATION: ICD-10-CM

## 2024-10-17 DIAGNOSIS — E66.812 CLASS 2 OBESITY DUE TO EXCESS CALORIES WITHOUT SERIOUS COMORBIDITY WITH BODY MASS INDEX (BMI) OF 37.0 TO 37.9 IN ADULT: ICD-10-CM

## 2024-10-17 DIAGNOSIS — E66.09 CLASS 2 OBESITY DUE TO EXCESS CALORIES WITHOUT SERIOUS COMORBIDITY WITH BODY MASS INDEX (BMI) OF 37.0 TO 37.9 IN ADULT: ICD-10-CM

## 2024-10-17 DIAGNOSIS — Z00.00 ROUTINE GENERAL MEDICAL EXAMINATION AT A HEALTH CARE FACILITY: Primary | ICD-10-CM

## 2024-10-17 DIAGNOSIS — Z23 NEED FOR VACCINATION: ICD-10-CM

## 2024-10-17 DIAGNOSIS — Z13.0 SCREENING FOR IRON DEFICIENCY ANEMIA: ICD-10-CM

## 2024-10-17 LAB
ALBUMIN SERPL-MCNC: 4.6 G/DL (ref 3.2–4.8)
ALBUMIN/GLOB SERPL: 1.5 {RATIO} (ref 1–2)
ALP LIVER SERPL-CCNC: 107 U/L
ALT SERPL-CCNC: 30 U/L
ANION GAP SERPL CALC-SCNC: 3 MMOL/L (ref 0–18)
AST SERPL-CCNC: 25 U/L (ref ?–34)
BASOPHILS # BLD AUTO: 0.07 X10(3) UL (ref 0–0.2)
BASOPHILS NFR BLD AUTO: 0.7 %
BILIRUB SERPL-MCNC: 0.4 MG/DL (ref 0.3–1.2)
BUN BLD-MCNC: 9 MG/DL (ref 9–23)
CALCIUM BLD-MCNC: 9.9 MG/DL (ref 8.7–10.4)
CHLORIDE SERPL-SCNC: 108 MMOL/L (ref 98–112)
CHOLEST SERPL-MCNC: 126 MG/DL (ref ?–200)
CO2 SERPL-SCNC: 26 MMOL/L (ref 21–32)
CREAT BLD-MCNC: 0.87 MG/DL
DEPRECATED HBV CORE AB SER IA-ACNC: 46 NG/ML
EGFRCR SERPLBLD CKD-EPI 2021: 85 ML/MIN/1.73M2 (ref 60–?)
EOSINOPHIL # BLD AUTO: 0.59 X10(3) UL (ref 0–0.7)
EOSINOPHIL NFR BLD AUTO: 6.1 %
ERYTHROCYTE [DISTWIDTH] IN BLOOD BY AUTOMATED COUNT: 16.9 %
FASTING PATIENT LIPID ANSWER: YES
FASTING STATUS PATIENT QL REPORTED: YES
GLOBULIN PLAS-MCNC: 3.1 G/DL (ref 2–3.5)
GLUCOSE BLD-MCNC: 83 MG/DL (ref 70–99)
HCT VFR BLD AUTO: 35 %
HDLC SERPL-MCNC: 38 MG/DL (ref 40–59)
HGB BLD-MCNC: 11.1 G/DL
IMM GRANULOCYTES # BLD AUTO: 0.03 X10(3) UL (ref 0–1)
IMM GRANULOCYTES NFR BLD: 0.3 %
IRON SATN MFR SERPL: 7 %
IRON SERPL-MCNC: 23 UG/DL
LDLC SERPL CALC-MCNC: 76 MG/DL (ref ?–100)
LYMPHOCYTES # BLD AUTO: 2.41 X10(3) UL (ref 1–4)
LYMPHOCYTES NFR BLD AUTO: 24.9 %
MCH RBC QN AUTO: 24.2 PG (ref 26–34)
MCHC RBC AUTO-ENTMCNC: 31.7 G/DL (ref 31–37)
MCV RBC AUTO: 76.4 FL
MONOCYTES # BLD AUTO: 0.83 X10(3) UL (ref 0.1–1)
MONOCYTES NFR BLD AUTO: 8.6 %
NEUTROPHILS # BLD AUTO: 5.74 X10 (3) UL (ref 1.5–7.7)
NEUTROPHILS # BLD AUTO: 5.74 X10(3) UL (ref 1.5–7.7)
NEUTROPHILS NFR BLD AUTO: 59.4 %
NONHDLC SERPL-MCNC: 88 MG/DL (ref ?–130)
OSMOLALITY SERPL CALC.SUM OF ELEC: 282 MOSM/KG (ref 275–295)
PLATELET # BLD AUTO: 456 10(3)UL (ref 150–450)
POTASSIUM SERPL-SCNC: 3.9 MMOL/L (ref 3.5–5.1)
PROT SERPL-MCNC: 7.7 G/DL (ref 5.7–8.2)
RBC # BLD AUTO: 4.58 X10(6)UL
SODIUM SERPL-SCNC: 137 MMOL/L (ref 136–145)
TOTAL IRON BINDING CAPACITY: 309 UG/DL (ref 250–425)
TRANSFERRIN SERPL-MCNC: 248 MG/DL (ref 250–380)
TRIGL SERPL-MCNC: 56 MG/DL (ref 30–149)
TSI SER-ACNC: 3.93 MIU/ML (ref 0.55–4.78)
VIT D+METAB SERPL-MCNC: 12.5 NG/ML (ref 30–100)
VLDLC SERPL CALC-MCNC: 9 MG/DL (ref 0–30)
WBC # BLD AUTO: 9.7 X10(3) UL (ref 4–11)

## 2024-10-17 PROCEDURE — 82728 ASSAY OF FERRITIN: CPT | Performed by: NURSE PRACTITIONER

## 2024-10-17 PROCEDURE — 80053 COMPREHEN METABOLIC PANEL: CPT | Performed by: NURSE PRACTITIONER

## 2024-10-17 PROCEDURE — 84443 ASSAY THYROID STIM HORMONE: CPT | Performed by: NURSE PRACTITIONER

## 2024-10-17 PROCEDURE — 82306 VITAMIN D 25 HYDROXY: CPT

## 2024-10-17 PROCEDURE — 90656 IIV3 VACC NO PRSV 0.5 ML IM: CPT | Performed by: FAMILY MEDICINE

## 2024-10-17 PROCEDURE — 85025 COMPLETE CBC W/AUTO DIFF WBC: CPT | Performed by: NURSE PRACTITIONER

## 2024-10-17 PROCEDURE — 99396 PREV VISIT EST AGE 40-64: CPT | Performed by: NURSE PRACTITIONER

## 2024-10-17 PROCEDURE — 36415 COLL VENOUS BLD VENIPUNCTURE: CPT | Performed by: NURSE PRACTITIONER

## 2024-10-17 PROCEDURE — 83540 ASSAY OF IRON: CPT | Performed by: NURSE PRACTITIONER

## 2024-10-17 PROCEDURE — 80061 LIPID PANEL: CPT | Performed by: NURSE PRACTITIONER

## 2024-10-17 PROCEDURE — 83550 IRON BINDING TEST: CPT | Performed by: NURSE PRACTITIONER

## 2024-10-17 PROCEDURE — 90471 IMMUNIZATION ADMIN: CPT | Performed by: FAMILY MEDICINE

## 2024-10-17 RX ORDER — MONTELUKAST SODIUM 10 MG/1
10 TABLET ORAL NIGHTLY
COMMUNITY

## 2024-10-17 RX ORDER — PHENTERMINE HYDROCHLORIDE 37.5 MG/1
37.5 TABLET ORAL
Qty: 30 TABLET | Refills: 0 | Status: SHIPPED | OUTPATIENT
Start: 2024-10-17 | End: 2024-11-16

## 2024-10-17 NOTE — PROGRESS NOTES
Alysia King is a 44 year old female who presents for a complete physical exam, no gyn.  HPI:     Chief Complaint   Patient presents with    Wellness Visit       Patient feels well, dental visit up to date, no hearing problem.  Vaccinations up to date.    Exercise: minimal,  twice per week, occasional.  Diet: watches minimally and watches somewhat    Wt Readings from Last 3 Encounters:   10/17/24 241 lb (109.3 kg)   24 230 lb (104.3 kg)   24 238 lb (108 kg)      BP Readings from Last 3 Encounters:   10/17/24 110/80   24 119/80   24 112/74     Patient's last menstrual period was 09/15/2024 (approximate).         Current Outpatient Medications   Medication Sig Dispense Refill    montelukast 10 MG Oral Tab Take 1 tablet (10 mg total) by mouth nightly.        Past Medical History:    COVID    2021      Past Surgical History:   Procedure Laterality Date      2016    Needle biopsy right  2021    Nodular adenosi, fibroadenomatous change    Oral surgery        Family History   Problem Relation Age of Onset    Other (Other) Father         cornea transplant    Diabetes Mother     Hypertension Mother     No Known Problems Sister     No Known Problems Sister     Breast Cancer Paternal Aunt 60    Ovarian Cancer Neg     Colon Cancer Neg       Social History     Socioeconomic History    Marital status:    Tobacco Use    Smoking status: Never    Smokeless tobacco: Never   Vaping Use    Vaping status: Never Used   Substance and Sexual Activity    Alcohol use: Not Currently    Drug use: Never    Sexual activity: Yes     Partners: Male     Birth control/protection: Tubal Ligation     Social Drivers of Health     Physical Activity: Insufficiently Active (10/20/2021)    Received from [employer]    Exercise Vital Sign     Days of Exercise per Week: 3 days     Minutes of Exercise per Session: 20 min        REVIEW OF SYSTEMS:   GENERAL HEALTH: feels  well, no fatigue.  SKIN: denies any unusual skin lesions or rashes  EYES: no visual complaints or deficits  HEENT: denies nasal congestion, sinus pain or sore throat; hearing loss negative   RESPIRATORY: denies shortness of breath, wheezing or cough   CARDIOVASCULAR: denies chest pain, SOB, edema,orthopnea, no palpitations   GI: denies nausea, vomiting, constipation, diarrhea; no rectal bleeding; no heartburn  GENITAL/: no dysuria, urgency or frequency  MUSCULOSKELETAL: no joint complaints upper or lower extremities  NEURO: no sensory or motor complaint  HEMATOLOGY: denies hx anemia; denies bruising or excessive bleeding  ENDOCRINE: denies excessive thirst or urination; denies unexpected wt gain or wt loss  ALLERGY/IMM.: denies food or seasonal allergies  PSYCH: no symptoms of depression or anxiety      EXAM:   /80   Pulse 68   Resp 16   Ht 5' 7\" (1.702 m)   Wt 241 lb (109.3 kg)   LMP 09/15/2024 (Approximate)   SpO2 98%   BMI 37.75 kg/m²      General: WD/WN in no acute distress.   HEENT: PERRLA and EOMI.  OP moist no lesions.  Neck is supple, with no cervical LAD or thyroid abnormalities. No carotid bruits.    Lungs: are clear to auscultation bilaterally, with no wheeze, rhonchi, or rales.   Heart: is RRR.  S1, S2, with no murmurs,clicks, gallops  Abdomen: is soft,NBS, NT/ND with no HSM.  No rebound or guarding. No CVA tenderness, no hernias.  Neuro: Cranial nerves II-XII normal,no focal abnormalities, and reflexes coordination and gait normal and symmetric.Sensation intact.  Extremities: are symmetric with no cyanosis, clubbing, or edema.  MS: Normal muscles tones, no joints abnormalities.  SKIN: Normal color, turgor, no lesions, rashes or wounds.  PSYCH: Normal affect and mood.          ASSESSMENT AND PLAN:     Alysia Knig is a 44 year old female who presents for a complete physical exam.   Diagnoses and all orders for this visit:    Routine general medical examination at a [facility]  facility    Laboratory exam ordered as part of routine general medical examination  -     Vitamin D; Future    Encounter for screening mammogram for malignant neoplasm of breast  -     CBC With Differential With Platelet  -     Comp Metabolic Panel (14)  -     Lipid Panel  -     Vitamin D; Future  -     TSH W Reflex To Free T4  -     [facility] MAMTA 2D+3D SCREENING BILAT (CPT=77067/30397); Future    Class 2 obesity due to excess calories without serious comorbidity with body mass index (BMI) of 37.0 to 37.9 in adult  -     Phentermine HCl 37.5 MG Oral Tab; Take 1 tablet (37.5 mg total) by mouth every morning before breakfast.    Need for vaccination  -     INFLUENZA VACCINE, TRI, PRESERV FREE, 0.5 ML       Pt's was recommended low fat diet and aerobic exercise 30 minutes three times weekly.   Health maintenance.   The patient indicates understanding of these issues and agrees to the plan.  The patient is asked to return for CPX in 1 year.

## 2024-10-17 NOTE — TELEPHONE ENCOUNTER
Please delete this Prior Authorization.  If phentermine is not covered then we have patient go thru [facility] since this med is reasonably priced.  Thanks!

## 2024-10-17 NOTE — TELEPHONE ENCOUNTER
Prior authorization initiated for  Phentermine, please answer questions below and send back to triage, davy        Has the patient attempted to lose weight through a healthcare provider supervised comprehensive lifestyle program which includes reducing caloric intake by about 30% (about 500 kcals relative to the patient's estimated total energy expenditure) per day, completing at least 150 minutes of exercise per week or exercise requirements cannot be met due to clinical limitations (including but not limited to cardiovascular conditions, physical limitations, fall risk) AND the patient has been unable to achieve 5% weight reduction with calorie deficit goals, exercise goals and behavior therapy for at least 6 months prior to requests for drug therapy?       Is the patient currently maintained on a reduced caloric intake of about 30% fewer calories (about 500 kcals relative to the patient's estimated total energy expenditure) per day AND completing at least 150 minutes of exercise per week, or exercise requirements cannot be met due to clinical limitations (including but not limited to cardiovascular conditions, physical limitations, fall risk)?

## 2024-11-29 ENCOUNTER — HOSPITAL ENCOUNTER (OUTPATIENT)
Dept: MAMMOGRAPHY | Age: 44
Discharge: HOME | End: 2024-11-29
Attending: NURSE PRACTITIONER
Payer: COMMERCIAL

## 2024-11-29 DIAGNOSIS — Z12.31 ENCOUNTER FOR SCREENING MAMMOGRAM FOR MALIGNANT NEOPLASM OF BREAST: ICD-10-CM

## 2024-11-29 PROCEDURE — 77067 SCR MAMMO BI INCL CAD: CPT | Performed by: NURSE PRACTITIONER

## 2024-11-29 PROCEDURE — 77063 BREAST TOMOSYNTHESIS BI: CPT | Performed by: NURSE PRACTITIONER

## 2024-12-02 ENCOUNTER — TELEMEDICINE (OUTPATIENT)
Dept: FAMILY MEDICINE CLINIC | Facility: CLINIC | Age: 44
End: 2024-12-02

## 2024-12-02 DIAGNOSIS — J40 SINOBRONCHITIS: Primary | ICD-10-CM

## 2024-12-02 DIAGNOSIS — J32.9 SINOBRONCHITIS: Primary | ICD-10-CM

## 2024-12-02 PROCEDURE — 99214 OFFICE O/P EST MOD 30 MIN: CPT | Performed by: FAMILY MEDICINE

## 2024-12-02 RX ORDER — BENZONATATE 200 MG/1
200 CAPSULE ORAL 3 TIMES DAILY PRN
Qty: 30 CAPSULE | Refills: 0 | Status: SHIPPED | OUTPATIENT
Start: 2024-12-02

## 2024-12-02 NOTE — PROGRESS NOTES
Virtual Video Check-In    Alysia King verbally consents to a Virtual/Video Check-In visit on 12/02/24.  Patient has been referred to the [facility] website at [email or web address] to review the yearly Consent to Treat document.    Patient understands and accepts financial responsibility for any deductible, co-insurance and/or co-pays associated with this service.    Duration of the service: 11 minutes      Summary of topics discussed:     HPI:    Reports 7d+ history of sinus congestion, purulent rhinorrhea, sore throat, dry cough. Sx are mostly unchanged.   Had very hoarse voice for 2 days but it has been better today.   Has self tested for COVID last week and it has been negative   No fevers. Did have to use albuterol inhaler last night d/t shortness of breath.   No chest pains. She does have pleuritic pain from coughing.   No ear pain, no ear discharge.   Has been taking tylenol/motrin, nyquil, flonase prn.      ROS: pertinent positives and negatives as per HPI     PE:   Gen: AAOx3  Head: NC/AT  Eyes: conjunctiva normal  Nose: No discharge visualized   Resp: breathing well on exam, normal effort. Able to speak full sentences without any SOB. Cough present   Skin: no rashes, no lesions, no cyanosis  Psych: normal affect       Diagnoses and all orders for this visit:    Sinobronchitis  -     amoxicillin clavulanate 875-125 MG Oral Tab; Take 1 tablet by mouth 2 (two) times daily for 10 days.  -     benzonatate 200 MG Oral Cap; Take 1 capsule (200 mg total) by mouth 3 (three) times daily as needed for cough.    Likely bacterial given continued symptoms. Will start on augmentin. Will provide with tessalon perles. Reviewed supportive care measures. Okay to continue dayquil/nyquil prn, tylenol/motrin prn (reviewed tylenol max 4000mg daily), push fluids, vitamin c/d/zinc prn. Reviewed return/call back precautions. F/u 1wk PRN.       Placido Hernandez MD

## 2025-01-02 ENCOUNTER — APPOINTMENT (OUTPATIENT)
Dept: GENERAL RADIOLOGY | Age: 45
End: 2025-01-02
Attending: PHYSICIAN ASSISTANT
Payer: COMMERCIAL

## 2025-01-02 ENCOUNTER — HOSPITAL ENCOUNTER (OUTPATIENT)
Age: 45
Discharge: HOME | End: 2025-01-02
Payer: COMMERCIAL

## 2025-01-02 VITALS
SYSTOLIC BLOOD PRESSURE: 134 MMHG | DIASTOLIC BLOOD PRESSURE: 74 MMHG | HEART RATE: 80 BPM | TEMPERATURE: 100 F | RESPIRATION RATE: 24 BRPM | OXYGEN SATURATION: 100 %

## 2025-01-02 DIAGNOSIS — R06.2 WHEEZING: Primary | ICD-10-CM

## 2025-01-02 DIAGNOSIS — R09.82 PND (POST-NASAL DRIP): ICD-10-CM

## 2025-01-02 LAB
POCT INFLUENZA A: NEGATIVE
POCT INFLUENZA B: NEGATIVE
S PYO AG THROAT QL IA.RAPID: NEGATIVE
SARS-COV-2 RNA RESP QL NAA+PROBE: NOT DETECTED

## 2025-01-02 PROCEDURE — 99214 OFFICE O/P EST MOD 30 MIN: CPT

## 2025-01-02 PROCEDURE — 87651 STREP A DNA AMP PROBE: CPT | Performed by: PHYSICIAN ASSISTANT

## 2025-01-02 PROCEDURE — 87502 INFLUENZA DNA AMP PROBE: CPT | Performed by: PHYSICIAN ASSISTANT

## 2025-01-02 PROCEDURE — 71046 X-RAY EXAM CHEST 2 VIEWS: CPT | Performed by: PHYSICIAN ASSISTANT

## 2025-01-02 RX ORDER — PREDNISONE 20 MG/1
40 TABLET ORAL DAILY
Qty: 8 TABLET | Refills: 0 | Status: SHIPPED | OUTPATIENT
Start: 2025-01-02 | End: 2025-01-06

## 2025-01-02 RX ORDER — DEXAMETHASONE 4 MG/1
4 TABLET ORAL ONCE
Status: COMPLETED | OUTPATIENT
Start: 2025-01-02 | End: 2025-01-02

## 2025-01-02 NOTE — ED PROVIDER NOTES
Patient Seen in: [facility]      History     Chief Complaint   Patient presents with    Sore Throat    Cough     Stated Complaint: Sore Throat    Subjective:   HPI    44-year-old female here with complaint of a sore throat and cough for the past 3 to 4 days stating it hurts to swallow.  States that she did a video visit on  and was given a 10-day course of Augmentin.  Patient reports that the cough never got better.  Patient denies chest pain, shortness of breath, abdominal pain, nausea, vomiting or diarrhea.  Patient is tolerating p.o. speaking full sentences.  Patient has been using her inhaler and wheezing.  Afebrile.      Objective:     Past Medical History:    COVID    2021              The patient's medication list, past medical history and social history elements  as listed in today's nurse's notes are reviewed and agree.   The patient's family history is reviewed and is noncontributory to the presenting problem, except as indicated as above.   Past Surgical History:   Procedure Laterality Date      2016    Needle biopsy right  2021    Nodular adenosi, fibroadenomatous change    Oral surgery                  Social History     Socioeconomic History    Marital status:    Tobacco Use    Smoking status: Never    Smokeless tobacco: Never   Vaping Use    Vaping status: Never Used   Substance and Sexual Activity    Alcohol use: Not Currently    Drug use: Never    Sexual activity: Yes     Partners: Male     Birth control/protection: Tubal Ligation     Social Drivers of Health     Physical Activity: Insufficiently Active (10/20/2021)    Received from [employer] [employer]    Exercise Vital Sign     Days of Exercise per Week: 3 days     Minutes of Exercise per Session: 20 min              Review of Systems    Positive for stated complaint: Sore Throat  Other systems are as noted in HPI.  Constitutional and vital signs reviewed.      All other systems  reviewed and negative except as noted above.    Physical Exam     ED Triage Vitals [01/02/25 1657]   /74   Pulse 80   Resp 24   Temp 99.6 °F (37.6 °C)   Temp src Oral   SpO2 100 %   O2 Device None (Room air)       Current Vitals:   Vital Signs  BP: 134/74  Pulse: 80  Resp: 24  Temp: 99.6 °F (37.6 °C)  Temp src: Oral    Oxygen Therapy  SpO2: 100 %  O2 Device: None (Room air)        Physical Exam  Vitals and nursing note reviewed.   Constitutional:       Appearance: She is well-developed.   HENT:      Head: Normocephalic.      Jaw: There is normal jaw occlusion.      Right Ear: External ear normal. Tympanic membrane is bulging.      Left Ear: External ear normal. Tympanic membrane is bulging.      Nose: Mucosal edema, congestion and rhinorrhea present. Rhinorrhea is clear.      Mouth/Throat:      Lips: Pink.      Mouth: Mucous membranes are moist.      Pharynx: Oropharynx is clear.      Comments: Uvula midline: No trismus or drooling: No peritonsillar abscess noted moderate cobblestoning in the posterior pharynx.  Eyes:      Conjunctiva/sclera: Conjunctivae normal.      Pupils: Pupils are equal, round, and reactive to light.   Cardiovascular:      Rate and Rhythm: Normal rate and regular rhythm.      Heart sounds: Normal heart sounds.   Pulmonary:      Effort: Pulmonary effort is normal.      Breath sounds: Normal breath sounds.   Musculoskeletal:      Cervical back: Normal range of motion and neck supple.   Skin:     General: Skin is warm.      Capillary Refill: Capillary refill takes less than 2 seconds.   Neurological:      General: No focal deficit present.      Mental Status: She is alert and oriented to person, place, and time.   Psychiatric:         Mood and Affect: Mood normal.         Behavior: Behavior normal.         Thought Content: Thought content normal.         Judgment: Judgment normal.           ED Course     Labs Reviewed   RAPID STREP A - Normal   RAPID SARS-COV-2 BY PCR - Normal   POCT FLU  TEST - Normal    Narrative:     This assay is a rapid molecular in vitro test utilizing nucleic acid amplification of influenza A and B viral RNA.   I personally reviewed the xray images and and saw these findings: no pneumonia  XR CHEST PA + LAT CHEST (CPT=71046)    Result Date: 1/2/2025  PROCEDURE:  XR CHEST PA + LAT CHEST (CPT=71046)  INDICATIONS:  Sore Throat  COMPARISON:  None.  TECHNIQUE:  PA and lateral chest radiographs were obtained.  PATIENT STATED HISTORY: (As transcribed by Technologist)  cough, sore throat    FINDINGS:  Cardiac silhouette is accentuated by technique.  No lobar consolidation.  No significant pleural fluid or pneumothorax.             CONCLUSION:  See above.   LOCATION:  [facility]   Dictated by (CST): Stromberg, LeRoy, MD on 1/02/2025 at 6:06 PM     Finalized by (CST): Stromberg, LeRoy, MD on 1/02/2025 at 6:08 PM                   MDM   Clinical Impression: wheezing/PND  Course of Treatment:   The decadron will work in your system the next several days.  You may start the additional prednisone on day 2 or 3 if symptoms persist.  Use your inhaler every 4-6 hours as needed.  Recommend taking an over the counter antihistamine daily: IE zyrtec/claritin.  Sleep more upright. Use chloraseptic spray to help stop the cough trigger reflex.  Push fluids and gargle with warm saline rinses.   If symptoms persist or worsen increasing fevers or shortness of breath recommend the emergency room.  Otherwise close follow-up with your PCP for further evaluation and treatment.    The patient is encouraged to return if any concerning symptoms arise. Additional verbal discharge instructions are given and discussed. Discharge medications are discussed. The patient is in good condition throughout the visit today and remains so upon discharge. I discuss the plan of care with the patient, who expresses understanding. All questions and concerns are addressed to the patient's satisfaction prior to discharge  today.  Previous conversations with PCP and charts were reviewed.                Disposition and Plan     Clinical Impression:  1. Wheezing    2. PND (post-nasal drip)         Disposition:  Discharge  1/2/2025  6:21 pm    Follow-up:  Placido Hernandez MD  [id number]  [facility] [address]  [phone]                Medications Prescribed:  Current Discharge Medication List        START taking these medications    Details   predniSONE 20 MG Oral Tab Take 2 tablets (40 mg total) by mouth daily for 4 days. Start on day 2-3 if symptoms persist  Qty: 8 tablet, Refills: 0                 Supplementary Documentation:

## 2025-01-02 NOTE — ED INITIAL ASSESSMENT (HPI)
C/o sore throat for 3-4 days.  Hurts to swallow. No fever. Cough since December was treated with antibiotic for 10 days during the virtual visit but cough never got better. It was a dry cough now is productive.

## 2025-01-03 NOTE — DISCHARGE INSTRUCTIONS
Please return to the ER/clinic if symptoms worsen. Follow-up with your PCP in 24-48 hours as needed.    The decadron will work in your system the next several days.  You may start the additional prednisone on day 2 or 3 if symptoms persist.  Use your inhaler every 4-6 hours as needed.  Recommend taking an over the counter antihistamine daily: IE zyrtec/claritin.  Sleep more upright. Use chloraseptic spray to help stop the cough trigger reflex.  Push fluids and gargle with warm saline rinses.   If symptoms persist or worsen increasing fevers or shortness of breath recommend the emergency room.  Otherwise close follow-up with your PCP for further evaluation and treatment.

## 2025-01-22 ENCOUNTER — OFFICE VISIT (OUTPATIENT)
Facility: CLINIC | Age: 45
End: 2025-01-22
Payer: COMMERCIAL

## 2025-01-22 VITALS
DIASTOLIC BLOOD PRESSURE: 72 MMHG | BODY MASS INDEX: 37.83 KG/M2 | HEIGHT: 67 IN | SYSTOLIC BLOOD PRESSURE: 118 MMHG | WEIGHT: 241 LBS

## 2025-01-22 DIAGNOSIS — Z01.419 ENCOUNTER FOR WELL WOMAN EXAM WITH ROUTINE GYNECOLOGICAL EXAM: Primary | ICD-10-CM

## 2025-01-22 DIAGNOSIS — Z98.51 S/P TUBAL LIGATION: ICD-10-CM

## 2025-01-22 DIAGNOSIS — Z00.00 ANNUAL PHYSICAL EXAM: ICD-10-CM

## 2025-01-22 DIAGNOSIS — D21.9 FIBROIDS: ICD-10-CM

## 2025-01-22 PROCEDURE — 99386 PREV VISIT NEW AGE 40-64: CPT | Performed by: OBSTETRICS & GYNECOLOGY

## 2025-01-22 PROCEDURE — 3078F DIAST BP <80 MM HG: CPT | Performed by: OBSTETRICS & GYNECOLOGY

## 2025-01-22 PROCEDURE — 3074F SYST BP LT 130 MM HG: CPT | Performed by: OBSTETRICS & GYNECOLOGY

## 2025-01-22 PROCEDURE — 3008F BODY MASS INDEX DOCD: CPT | Performed by: OBSTETRICS & GYNECOLOGY

## 2025-01-22 NOTE — PROGRESS NOTES
GYN H&P     Genetic questionnaire reviewed with the patient and she will be referred for genetic counseling if the questionnaire had any positive results.    The [facility] intake form was also reviewed regarding contraception, menstrual periods, urinary health, and vaginal / sexual health    2025  4:56 PM    Chief Complaint   Patient presents with    Gynecologic Exam       HPI: Alysia is a 44 year old  Patient's last menstrual period was 2025 (exact date).  (contraception: TL) here for her annual gyn exam.     She has no complaints.   Menses are regular for the most part,  missed one then had a heavy one. . Denies any pelvic or breast complaints.   Satisfied with current contraception.     Previous encounters and chart reviewed.     OB History    Para Term  AB Living   2 2 2     2   SAB IAB Ectopic Multiple Live Births           2      # Outcome Date GA Lbr Russell/2nd Weight Sex Type Anes PTL Lv   2 Term 17 40w0d  7 lb (3.175 kg) M CS-Unspec   DARRYN   1 Term 02 40w0d  7 lb (3.175 kg) F Vag-Spont   DARRYN       GYN hx:   Menarche: 14  Period Cycle (Days): Monthly  Period Duration (Days): 3-4  Use of Birth Control (if yes, specify type): Tubal Ligation  Hx Prior Abnormal Pap: No  Pap Date: 23  Pap Result Notes: Neg/Neg ; 1/15/2020 NEG/NEG ; 2019 Normal  Follow Up Recommendation:       Past Medical History:    COVID    2021     Past Surgical History:   Procedure Laterality Date      2016    Needle biopsy right  2021    Nodular adenosi, fibroadenomatous change    Oral surgery       Allergies[1]  Medications Ordered Prior to Encounter[2]  Family History   Problem Relation Age of Onset    Other (Other) Father         cornea transplant    Diabetes Mother     Hypertension Mother     No Known Problems Sister     No Known Problems Sister     Breast Cancer Paternal Aunt 65    Ovarian Cancer Neg     Colon Cancer Neg      Social History      Socioeconomic History    Marital status:      Spouse name: Not on file    Number of children: Not on file    Years of education: Not on file    Highest education level: Not on file   Occupational History    Not on file   Tobacco Use    Smoking status: Never     Passive exposure: Never    Smokeless tobacco: Never   Vaping Use    Vaping status: Never Used   Substance and Sexual Activity    Alcohol use: Not Currently    Drug use: Never    Sexual activity: Yes     Partners: Male     Birth control/protection: Tubal Ligation   Other Topics Concern    Not on file   Social History Narrative    Not on file     Social Drivers of Health     Financial Resource Strain: Not on file   Food Insecurity: Not on file   Transportation Needs: Not on file   Physical Activity: Insufficiently Active (10/20/2021)    [facility] from [facility]    Exercise Vital Sign     Days of Exercise per Week: 3 days     Minutes of Exercise per Session: 20 min   Stress: Not on file   Social Connections: Not on file   Housing Stability: Not on file       ROS:     Review of Systems:  General: denies fevers, chills, fatigue and malaise.   Eyes: no visual changes, denies headaches  ENT: no complaints, denies earaches, runny nose, epistaxis, throat pain or sore throat  Respiratory: denies SOB, dyspnea, cough or wheezing  Cardiovascular: denies chest pain, palpitations, exercise intolerance   GI: denies abdominal pain, diarrhea, constipation  : no complaints, denies dysuria, increased urinary frequency. Menses regular, no dysmenorrhea, no menorrhagia, no dyspareunia   Hematological/lymphatic: denies history of excessive bleeding or bruising, denies dizziness, lightheadedness.   Breast: denies rashes, skin changes, pain, lumps or discharge   Psychiatric: denies depression, changes in sleep patterns, anxiety  Endocrine: denies hot or cold intolerance, mood changes   Neurological: denies changes in sight, smell, hearing or  taste. Denies seizures or tremors  Immunological: denies allergies, denies anaphylaxis, or swollen lymph nodes  Musculoskeletal: denies joint pain, morning stiffness, decreased range of motion         O /72   Ht 67\"   Wt 241 lb (109.3 kg)   LMP 01/11/2025 (Exact Date)   BMI 37.75 kg/m²         Wt Readings from Last 6 Encounters:   01/22/25 241 lb (109.3 kg)   10/17/24 241 lb (109.3 kg)   04/01/24 230 lb (104.3 kg)   01/19/24 238 lb (108 kg)   10/31/23 242 lb (109.8 kg)   01/09/23 224 lb (101.6 kg)     Exam:   GENERAL: well developed, well nourished, in no apparent distress, oriented.  SKIN: no rashes, no suspicious lesions  HEENT: normal  NECK: supple; no thyromegaly, no adenopathy  LUNGS: clear to auscultation  CARDIOVASCULAR: normal S1, S2, RRR  BREASTS: soft, nontender, no palpable masses or nodes, no nipple discharge, no skin changes, no axillary adenopathy  ABDOMEN: pfannenstiel scar,  soft, non distended; non tender, no masses  PELVIC: External Genitalia: Normal appearing, no lesions.    Vagina: normal pink mucosa, no lesions, normal clear discharge.    Bladder well supported.  No  anterior or posterior hernias    Cervix: nulliparous, no lesions , No CMT     Uterus: NSS, mobile, non tender, normal size    Adnexa: non tender, no masses, normal size    Rectal: deferred  EXTREMITIES:  non tender without edema        A/P: Patient is 44 year old female with no complaints. Here for well woman exam.       Patient counseled on:    Diet/exercise.      Self Breast Exams     Safe sex practices / and living environment     Vaccines:  Annual Flu, Tdap +/- Gardasil not recommended (up to 45 yrs).      Pneumococcal at 65 yrs old, Shingles at 60 yrs old          Pap: neg/neg - Year:  2023  GC/Chlamydia:  na  Mammogram:  negative,  2024  Dexa:  na  Colonoscopy / Cologuard:   na  Lipid / Cholesterol:    Lipid Panel [[id number]] (Abnormal)  Resulted: 10/17/24 1552, Result status: Final result  Resulting lab: [facility] ([facility])     Specimen Information    ID Type Source Collected On   [id number] Blood -- 10/17/24 0825     Components    Component Value Reference Range Flag   Cholesterol, Total 126 <200 mg/dL --   Comment: Desirable  <200 mg/dL  Borderline  200-239 mg/dL  High      >=240 mg/dL     HDL Cholesterol 38 40 - 59 mg/dL L Low    Comment:      Interpretive Information:  An HDL cholesterol <40 mg/dL is low and constitutes a coronary heart disease risk factor. An HDL cholesterol >60 mg/dL is a negative risk factor for coronary heart disease.     Triglycerides 56 30 - 149 mg/dL --   Comment:      Reference interval for fasting triglycerides  Desirable: <150 mg/dL  Borderline: 150-199 mg/dL  High: 200-499 mg/dL  Very High: >=500 mg/dL       LDL Cholesterol 76 <100 mg/dL --   Comment:      Optimal            <100 mg/dL  Near/Above OptimaL 100-129 mg/dL  Borderline High    130-159 mg/dL  High               160-189 mg/dL    Very High          >190 mg/dL     VLDL 9 0 - 30 mg/dL --   Non HDL Chol 88 <130 mg/dL --   Comment:      Desirable  <130 mg/dL  Borderline  130-159 mg/dL  High        160-189 mg/dL      Very high >=190 mg/dL            Meds This Visit:    Requested Prescriptions      No prescriptions requested or ordered in this encounter       1. Encounter for well woman exam with routine gynecological exam    2. Annual physical exam    3. S/P tubal ligation, 2022    4. Fibroids      No follow-ups on file.    Vinny Rizzo MD   1/22/2025  4:56 PM       This note was created by [employer] voice recognition. Errors in content may be related to improper recognition by the system; efforts to review and correct have been done but errors may still exist. Please contact me with any questions.    Note to patient and family   The 21st Century Cures Act makes medical notes available to patients in the interest of transparency.  However, please be advised that this is a medical document.  It is intended  as odaa-me-ktqa communication.  It is written in medical language which may contain abbreviations or verbiage that are technical and unfamiliar.  It may appear blunt or direct.  Medical documents are intended to carry relevant information, facts as evident, and the clinical opinion of the practitioner.           [1]   Allergies  Allergen Reactions    Nickel RASH   [2]   Current Outpatient Medications on File Prior to Visit   Medication Sig Dispense Refill    montelukast 10 MG Oral Tab Take 1 tablet (10 mg total) by mouth nightly.      ergocalciferol 1.25 MG (54202 UT) Oral Cap Take 1 capsule (50,000 Units total) by mouth once a week. 4 capsule 3     No current facility-administered medications on file prior to visit.

## 2025-01-31 DIAGNOSIS — E55.9 VITAMIN D DEFICIENCY DISEASE: ICD-10-CM

## 2025-02-03 DIAGNOSIS — E55.9 VITAMIN D DEFICIENCY DISEASE: ICD-10-CM

## 2025-02-03 RX ORDER — ERGOCALCIFEROL 1.25 MG/1
50000 CAPSULE, LIQUID FILLED ORAL WEEKLY
Qty: 4 CAPSULE | Refills: 3 | OUTPATIENT
Start: 2025-02-03

## 2025-02-05 ENCOUNTER — LAB ENCOUNTER (OUTPATIENT)
Dept: LAB | Age: 45
End: 2025-02-05
Attending: NURSE PRACTITIONER
Payer: COMMERCIAL

## 2025-02-05 DIAGNOSIS — E55.9 VITAMIN D DEFICIENCY: ICD-10-CM

## 2025-02-05 LAB — VIT D+METAB SERPL-MCNC: 70.4 NG/ML (ref 30–100)

## 2025-02-05 PROCEDURE — 82306 VITAMIN D 25 HYDROXY: CPT | Performed by: NURSE PRACTITIONER

## 2025-06-10 RX ORDER — MONTELUKAST SODIUM 10 MG/1
10 TABLET ORAL NIGHTLY
Refills: 0 | OUTPATIENT
Start: 2025-06-10

## 2025-08-16 ENCOUNTER — PATIENT MESSAGE (OUTPATIENT)
Dept: FAMILY MEDICINE CLINIC | Facility: CLINIC | Age: 45
End: 2025-08-16